# Patient Record
Sex: MALE | Race: WHITE | NOT HISPANIC OR LATINO | Employment: FULL TIME | URBAN - METROPOLITAN AREA
[De-identification: names, ages, dates, MRNs, and addresses within clinical notes are randomized per-mention and may not be internally consistent; named-entity substitution may affect disease eponyms.]

---

## 2023-07-17 LAB
ALANINE AMINOTRANSFERASE (SGPT) (U/L) IN SER/PLAS: 21 U/L (ref 10–52)
ALBUMIN (G/DL) IN SER/PLAS: 4.5 G/DL (ref 3.4–5)
ALKALINE PHOSPHATASE (U/L) IN SER/PLAS: 51 U/L (ref 33–120)
ANION GAP IN SER/PLAS: 9 MMOL/L (ref 10–20)
ASPARTATE AMINOTRANSFERASE (SGOT) (U/L) IN SER/PLAS: 18 U/L (ref 9–39)
BASOPHILS (10*3/UL) IN BLOOD BY AUTOMATED COUNT: 0.04 X10E9/L (ref 0–0.1)
BASOPHILS/100 LEUKOCYTES IN BLOOD BY AUTOMATED COUNT: 0.9 % (ref 0–2)
BILIRUBIN TOTAL (MG/DL) IN SER/PLAS: 0.9 MG/DL (ref 0–1.2)
CALCIUM (MG/DL) IN SER/PLAS: 9.5 MG/DL (ref 8.6–10.3)
CARBON DIOXIDE, TOTAL (MMOL/L) IN SER/PLAS: 29 MMOL/L (ref 21–32)
CHLORIDE (MMOL/L) IN SER/PLAS: 105 MMOL/L (ref 98–107)
CHOLESTEROL (MG/DL) IN SER/PLAS: 172 MG/DL (ref 0–199)
CHOLESTEROL IN HDL (MG/DL) IN SER/PLAS: 54.5 MG/DL
CHOLESTEROL/HDL RATIO: 3.2
CREATININE (MG/DL) IN SER/PLAS: 1 MG/DL (ref 0.5–1.3)
EOSINOPHILS (10*3/UL) IN BLOOD BY AUTOMATED COUNT: 0.08 X10E9/L (ref 0–0.7)
EOSINOPHILS/100 LEUKOCYTES IN BLOOD BY AUTOMATED COUNT: 1.8 % (ref 0–6)
ERYTHROCYTE DISTRIBUTION WIDTH (RATIO) BY AUTOMATED COUNT: 13.1 % (ref 11.5–14.5)
ERYTHROCYTE MEAN CORPUSCULAR HEMOGLOBIN CONCENTRATION (G/DL) BY AUTOMATED: 33.7 G/DL (ref 32–36)
ERYTHROCYTE MEAN CORPUSCULAR VOLUME (FL) BY AUTOMATED COUNT: 91 FL (ref 80–100)
ERYTHROCYTES (10*6/UL) IN BLOOD BY AUTOMATED COUNT: 4.64 X10E12/L (ref 4.5–5.9)
GFR MALE: >90 ML/MIN/1.73M2
GLUCOSE (MG/DL) IN SER/PLAS: 94 MG/DL (ref 74–99)
HEMATOCRIT (%) IN BLOOD BY AUTOMATED COUNT: 42.1 % (ref 41–52)
HEMOGLOBIN (G/DL) IN BLOOD: 14.2 G/DL (ref 13.5–17.5)
IMMATURE GRANULOCYTES/100 LEUKOCYTES IN BLOOD BY AUTOMATED COUNT: 0.2 % (ref 0–0.9)
LDL: 95 MG/DL (ref 0–99)
LEUKOCYTES (10*3/UL) IN BLOOD BY AUTOMATED COUNT: 4.6 X10E9/L (ref 4.4–11.3)
LYMPHOCYTES (10*3/UL) IN BLOOD BY AUTOMATED COUNT: 1.5 X10E9/L (ref 1.2–4.8)
LYMPHOCYTES/100 LEUKOCYTES IN BLOOD BY AUTOMATED COUNT: 32.9 % (ref 13–44)
MONOCYTES (10*3/UL) IN BLOOD BY AUTOMATED COUNT: 0.42 X10E9/L (ref 0.1–1)
MONOCYTES/100 LEUKOCYTES IN BLOOD BY AUTOMATED COUNT: 9.2 % (ref 2–10)
NEUTROPHILS (10*3/UL) IN BLOOD BY AUTOMATED COUNT: 2.51 X10E9/L (ref 1.2–7.7)
NEUTROPHILS/100 LEUKOCYTES IN BLOOD BY AUTOMATED COUNT: 55 % (ref 40–80)
NRBC (PER 100 WBCS) BY AUTOMATED COUNT: 0 /100 WBC (ref 0–0)
PLATELETS (10*3/UL) IN BLOOD AUTOMATED COUNT: 233 X10E9/L (ref 150–450)
POTASSIUM (MMOL/L) IN SER/PLAS: 4 MMOL/L (ref 3.5–5.3)
PROTEIN TOTAL: 6.8 G/DL (ref 6.4–8.2)
SODIUM (MMOL/L) IN SER/PLAS: 139 MMOL/L (ref 136–145)
THYROTROPIN (MIU/L) IN SER/PLAS BY DETECTION LIMIT <= 0.05 MIU/L: 1.87 MIU/L (ref 0.44–3.98)
TRIGLYCERIDE (MG/DL) IN SER/PLAS: 113 MG/DL (ref 0–149)
UREA NITROGEN (MG/DL) IN SER/PLAS: 14 MG/DL (ref 6–23)
VLDL: 23 MG/DL (ref 0–40)

## 2023-08-07 LAB — PROSTATE SPECIFIC ANTIGEN,SCREEN: 0.18 NG/ML (ref 0–4)

## 2023-10-31 ENCOUNTER — LAB (OUTPATIENT)
Dept: LAB | Facility: LAB | Age: 43
End: 2023-10-31
Payer: COMMERCIAL

## 2023-10-31 ENCOUNTER — OFFICE VISIT (OUTPATIENT)
Dept: PRIMARY CARE | Facility: CLINIC | Age: 43
End: 2023-10-31
Payer: COMMERCIAL

## 2023-10-31 VITALS
HEART RATE: 52 BPM | WEIGHT: 182.4 LBS | SYSTOLIC BLOOD PRESSURE: 133 MMHG | HEIGHT: 67 IN | DIASTOLIC BLOOD PRESSURE: 90 MMHG | TEMPERATURE: 97.7 F | BODY MASS INDEX: 28.63 KG/M2

## 2023-10-31 DIAGNOSIS — R53.83 TIREDNESS: ICD-10-CM

## 2023-10-31 DIAGNOSIS — L81.8 TATTOO: ICD-10-CM

## 2023-10-31 DIAGNOSIS — E66.3 OVERWEIGHT: ICD-10-CM

## 2023-10-31 DIAGNOSIS — I35.1 AORTIC EJECTION MURMUR: ICD-10-CM

## 2023-10-31 DIAGNOSIS — R37 SEXUAL DYSFUNCTION: ICD-10-CM

## 2023-10-31 DIAGNOSIS — R37 SEXUAL DYSFUNCTION: Primary | ICD-10-CM

## 2023-10-31 DIAGNOSIS — N52.8 OTHER MALE ERECTILE DYSFUNCTION: ICD-10-CM

## 2023-10-31 DIAGNOSIS — R55 SYNCOPE, VASOVAGAL: ICD-10-CM

## 2023-10-31 DIAGNOSIS — Z56.6 STRESS AT WORK: ICD-10-CM

## 2023-10-31 LAB
CRP SERPL-MCNC: <0.1 MG/DL
ERYTHROCYTE [SEDIMENTATION RATE] IN BLOOD BY WESTERGREN METHOD: <1 MM/H (ref 0–15)
VIT B12 SERPL-MCNC: 846 PG/ML (ref 211–911)

## 2023-10-31 PROCEDURE — 84402 ASSAY OF FREE TESTOSTERONE: CPT

## 2023-10-31 PROCEDURE — 86140 C-REACTIVE PROTEIN: CPT

## 2023-10-31 PROCEDURE — 36415 COLL VENOUS BLD VENIPUNCTURE: CPT

## 2023-10-31 PROCEDURE — 99214 OFFICE O/P EST MOD 30 MIN: CPT | Performed by: INTERNAL MEDICINE

## 2023-10-31 PROCEDURE — 85652 RBC SED RATE AUTOMATED: CPT

## 2023-10-31 PROCEDURE — 82607 VITAMIN B-12: CPT

## 2023-10-31 PROCEDURE — 1036F TOBACCO NON-USER: CPT | Performed by: INTERNAL MEDICINE

## 2023-10-31 RX ORDER — FLUTICASONE PROPIONATE 50 MCG
2 SPRAY, SUSPENSION (ML) NASAL
COMMUNITY

## 2023-10-31 RX ORDER — FINASTERIDE 5 MG
TABLET ORAL
COMMUNITY

## 2023-10-31 SDOH — HEALTH STABILITY - MENTAL HEALTH: OTHER PHYSICAL AND MENTAL STRAIN RELATED TO WORK: Z56.6

## 2023-10-31 NOTE — PROGRESS NOTES
Assessment/Plan     43 years old male who is generally healthy came with nonspecific symptoms of tiredness and sexual dysfunction.  He also has some erectile dysfunction.  Patient's exam is without any significant abnormalities.  Patient denies depression and he does not want to take any antidepressants.  His lab test from July and his visits in July were reviewed and discussed.  He will have the blood test as ordered and depending on the results we will decide about any intervention.  Low testosterone was discussed in detail.    Patient has chronic arctic murmur for which she has had previous investigations.  We will do an 2D echocardiogram.  Patient also had previous tilt table test which was without any significant abnormalities.  He was also seen by cardiologist in the past.    Patient will be followed up in 4 to 6 weeks time to have further evaluation depending on the labs and his symptoms.      Problem List Items Addressed This Visit       Overweight    Relevant Orders    Testosterone,Free and Total    Syncope, vasovagal    Tattoo    Relevant Orders    Testosterone,Free and Total    Sexual dysfunction - Primary    Relevant Orders    Testosterone,Free and Total    CBC and Auto Differential    Comprehensive Metabolic Panel    C-Reactive Protein    Sedimentation Rate     Other Visit Diagnoses       Other male erectile dysfunction        Relevant Orders    Testosterone,Free and Total    Tiredness        Relevant Orders    Testosterone,Free and Total    Vitamin D 25-Hydroxy,Total (for eval of Vitamin D levels)    Vitamin B12    Stress at work        Aortic ejection murmur        Relevant Orders    Transthoracic Echo (TTE) Complete            Subjective     Patient ID: Nj Veras is a 43 y.o. male who presents for Follow-up (Blood work).    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    Patient says he has a excellent marriage life.  He has 2 children.  Other than usual stress at work he has no depression.  He is feeling generally tired all the time even though he is exercising regularly.  He has less sexual desire and also he has some erectile dysfunction.  He denies any further episodes of syncope.  He denies any weight loss.  He is not suicidal or homicidal.  ROS  Rest of the review of systems no acute complaints.  No family history on file.   Social History     Socioeconomic History    Marital status:      Spouse name: Not on file    Number of children: Not on file    Years of education: Not on file    Highest education level: Not on file   Occupational History    Not on file   Tobacco Use    Smoking status: Never    Smokeless tobacco: Never   Substance and Sexual Activity    Alcohol use: Not Currently    Drug use: Never    Sexual activity: Not on file   Other Topics Concern    Not on file   Social History Narrative    Not on file     Social Determinants of Health     Financial Resource Strain: Not on file   Food Insecurity: Not on file   Transportation Needs: Not on file   Physical Activity: Not on file   Stress: Not on file   Social Connections: Not on file   Intimate Partner Violence: Not on file   Housing Stability: Not on file      Patient has no known allergies.   Current Outpatient Medications   Medication Sig Dispense Refill    fluticasone (Flonase) 50 mcg/actuation nasal spray Administer 2 sprays into affected nostril(s) once daily.      Proscar 5 mg tablet TAKE 1/4 (ONE-FOURTH) TABLET BY MOUTH EVERY DAY       No current facility-administered medications for this visit.        Objective     Vitals:    10/31/23 1209   BP: 133/90   Pulse: 52   Temp: 36.5 °C (97.7 °F)        Physical Exam   Normal-built, well-nourished  with no apparent distress. Alert oriented  Has severe  Skin:  Normal turgor.  No  rash.  Head:  Normocephalic, atraumatic.  Eyes:  Pupils are equal, round,.  No pallor of conjunctivae.  Mouth has moist oral mucosa.  Pharynx appears normal.  No erythema.  Neck:  Supple.  No JVD.  No carotid bruit.  No thyromegaly. No cervical lymphadenopathy.  No clubbing  Chest:  Vesicular breathing Bilaterally good air entry and bilaterally clear to auscultation.  No wheezing.  No crackles.  Heart:  Regular rate and rhythm.  S1, S2 positive.  Ejection systolic murmur over the aortic area.  Abdomen:  Soft and nontender.  Bowel sounds are positive.  No organomegaly.  Extremities:  Bilaterally no pedal pitting edema.  Bilaterally 2+ dorsalis pedis pulses.  No calf tenderness. Homans sign is negative.  Neuro Exam: No focal signs. Gait is normal.      Problem List Items Addressed This Visit       Overweight    Relevant Orders    Testosterone,Free and Total    Syncope, vasovagal    Tattoo    Relevant Orders    Testosterone,Free and Total    Sexual dysfunction - Primary    Relevant Orders    Testosterone,Free and Total    CBC and Auto Differential    Comprehensive Metabolic Panel    C-Reactive Protein    Sedimentation Rate     Other Visit Diagnoses       Other male erectile dysfunction        Relevant Orders    Testosterone,Free and Total    Tiredness        Relevant Orders    Testosterone,Free and Total    Vitamin D 25-Hydroxy,Total (for eval of Vitamin D levels)    Vitamin B12    Stress at work        Aortic ejection murmur        Relevant Orders    Transthoracic Echo (TTE) Complete             Orders Placed This Encounter   Procedures    Testosterone,Free and Total     Standing Status:   Future     Standing Expiration Date:   10/31/2024     Order Specific Question:   Release result to [facility]     Answer:   Immediate [1]    Vitamin D 25-Hydroxy,Total (for eval of Vitamin D levels)     Standing Status:   Future     Standing Expiration Date:   10/31/2024     Order Specific Question:   Release result to [facility]      Answer:   Immediate    Vitamin B12     Standing Status:   Future     Standing Expiration Date:   10/31/2024     Order Specific Question:   [facility]     Answer:   Immediate [1]    CBC and Auto Differential     Standing Status:   Future     Standing Expiration Date:   10/31/2024     Order Specific Question:   [facility]     Answer:   Immediate    Comprehensive Metabolic Panel     Standing Status:   Future     Standing Expiration Date:   10/31/2024     Order Specific Question:   [facility]     Answer:   Immediate    C-Reactive Protein     Standing Status:   Future     Standing Expiration Date:   10/31/2024     Order Specific Question:   [facility]     Answer:   Immediate [1]    Sedimentation Rate     Standing Status:   Future     Standing Expiration Date:   10/31/2024     Order Specific Question:   [facility]     Answer:   Immediate [1]    Transthoracic Echo (TTE) Complete     Standing Status:   Future     Standing Expiration Date:   10/31/2025     Order Specific Question:   Reason for exam:     Answer:   Aortic murmur     Order Specific Question:   Possible 3D echo?     Answer:   No     Order Specific Question:   Possible strain echo?     Answer:   No     Order Specific Question:   Where is your preferred location to perform this study?     Answer:   First Available        Lab Results   Component Value Date    WBC 4.6 07/17/2023    HGB 14.2 07/17/2023    HCT 42.1 07/17/2023     07/17/2023    CHOL 172 07/17/2023    TRIG 113 07/17/2023    HDL 54.5 07/17/2023    ALT 21 07/17/2023    AST 18 07/17/2023     07/17/2023    K 4.0 07/17/2023     07/17/2023    CREATININE 1.00 07/17/2023    BUN 14 07/17/2023    CO2 29 07/17/2023    TSH 1.87 07/17/2023     Lab Results   Component Value Date    CHOL 172 07/17/2023    CHHDL 3.2 07/17/2023       No results found.

## 2023-11-02 ENCOUNTER — TELEPHONE (OUTPATIENT)
Dept: PRIMARY CARE | Facility: CLINIC | Age: 43
End: 2023-11-02
Payer: COMMERCIAL

## 2023-11-02 NOTE — TELEPHONE ENCOUNTER
----- Message from Karena Fernández MD sent at 11/1/2023  3:53 PM EDT -----  Please, inform the pt that the recent BW result for vitamin B 12 level was WNL.  Thank you

## 2023-11-04 LAB
TESTOSTERONE FREE (CHAN): 62.2 PG/ML (ref 35–155)
TESTOSTERONE,TOTAL,LC-MS/MS: 468 NG/DL (ref 250–1100)

## 2023-11-13 ENCOUNTER — HOSPITAL ENCOUNTER (OUTPATIENT)
Dept: CARDIOLOGY | Facility: CLINIC | Age: 43
Discharge: HOME | End: 2023-11-13
Payer: COMMERCIAL

## 2023-11-13 ENCOUNTER — APPOINTMENT (OUTPATIENT)
Dept: CARDIOLOGY | Facility: CLINIC | Age: 43
End: 2023-11-13
Payer: COMMERCIAL

## 2023-11-13 VITALS
WEIGHT: 182 LBS | HEIGHT: 67 IN | SYSTOLIC BLOOD PRESSURE: 126 MMHG | BODY MASS INDEX: 28.56 KG/M2 | DIASTOLIC BLOOD PRESSURE: 76 MMHG

## 2023-11-13 DIAGNOSIS — I35.1 AORTIC EJECTION MURMUR: ICD-10-CM

## 2023-11-13 PROCEDURE — 93306 TTE W/DOPPLER COMPLETE: CPT | Performed by: INTERNAL MEDICINE

## 2023-11-13 PROCEDURE — 93306 TTE W/DOPPLER COMPLETE: CPT

## 2023-11-16 LAB
AORTIC VALVE MEAN GRADIENT: 7
AORTIC VALVE PEAK VELOCITY: 1.77
AV PEAK GRADIENT: 12.5
AVA (PEAK VEL): 2.18
AVA (VTI): 1.98
EJECTION FRACTION APICAL 4 CHAMBER: 67.3
LEFT VENTRICLE INTERNAL DIMENSION DIASTOLE: 5.56 (ref 3.5–6)
LEFT VENTRICULAR OUTFLOW TRACT DIAMETER: 2
MITRAL VALVE E/A RATIO: 1.33
MITRAL VALVE E/E' RATIO: 6.3
RIGHT VENTRICLE PEAK SYSTOLIC PRESSURE: 23.1

## 2023-12-06 ENCOUNTER — OFFICE VISIT (OUTPATIENT)
Dept: PRIMARY CARE | Facility: CLINIC | Age: 43
End: 2023-12-06
Payer: COMMERCIAL

## 2023-12-06 VITALS
BODY MASS INDEX: 28.79 KG/M2 | TEMPERATURE: 97.5 F | WEIGHT: 183.4 LBS | HEIGHT: 67 IN | HEART RATE: 50 BPM | SYSTOLIC BLOOD PRESSURE: 129 MMHG | DIASTOLIC BLOOD PRESSURE: 82 MMHG

## 2023-12-06 DIAGNOSIS — L81.8 TATTOO: ICD-10-CM

## 2023-12-06 DIAGNOSIS — R37 SEXUAL DYSFUNCTION: ICD-10-CM

## 2023-12-06 DIAGNOSIS — E66.3 OVERWEIGHT: Primary | ICD-10-CM

## 2023-12-06 PROCEDURE — 99213 OFFICE O/P EST LOW 20 MIN: CPT | Performed by: INTERNAL MEDICINE

## 2023-12-06 PROCEDURE — 1036F TOBACCO NON-USER: CPT | Performed by: INTERNAL MEDICINE

## 2023-12-06 ASSESSMENT — PATIENT HEALTH QUESTIONNAIRE - PHQ9
1. LITTLE INTEREST OR PLEASURE IN DOING THINGS: NOT AT ALL
2. FEELING DOWN, DEPRESSED OR HOPELESS: NOT AT ALL
SUM OF ALL RESPONSES TO PHQ9 QUESTIONS 1 AND 2: 0

## 2023-12-06 NOTE — PROGRESS NOTES
Assessment/Plan   43 years old male who is healthy and active as acceptable blood test physical exam and also echocardiogram.  Patient's echocardiogram was reviewed in detail on this visit.  We reviewed and discussed the patient's concern about sexual dysfunction.  I offered him to have a small dose of Viagra or similar medications to see whether that will help but patient at this time does not want to use them.  On my clinical exam there is no significant abnormalities which could give rise to his symptoms of sexual dysfunction.  Patient will see a urologist for further evaluation and appropriate treatment or management options.  He does not want to do any counseling and he feels like he is mentally stable and he has good relationship with his spouse.    Patient will be followed up in 6 months time with the lab test as ordered but if there is any concern or need arises patient understands to see me soon.      Problem List Items Addressed This Visit       Overweight - Primary    Relevant Orders    CBC and Auto Differential    Comprehensive Metabolic Panel    Lipid Panel    Tattoo    Sexual dysfunction    Relevant Orders    Referral to Urology    CBC and Auto Differential    Comprehensive Metabolic Panel    Lipid Panel       Subjective     Patient ID: Nj Veras is a 43 y.o. male who presents for Follow-up (Follow up).    History of present illness  43 years old male who is generally healthy and active came for an follow-up visit.  Overall patient is doing very well but he still have concern about his sexual dysfunction.  He has a good marriage life.  They went on vacation to [facility].  Patient says his sexual desire is usually normal except he does not have the ejaculation as it was in the past.  He says it is almost like blocked.  His erection is satisfactory but he is cannot have the ejaculation which makes it difficult for him to have the sexual desire.  We had a detailed investigations including serum  testosterone levels, any other secondary causes such as thyroid dysfunction has been excluded.  Patient is taking 1 mg Proscar as per the dermatologist.  He says he stopped it for 2 weeks and no changes.    He is active and he exercises regularly.    His echocardiogram was reviewed in this visit in detail    Rest of the review of systems no acute complaints.    No family history on file.   Social History     Socioeconomic History    Marital status:      Spouse name: Not on file    Number of children: Not on file    Years of education: Not on file    Highest education level: Not on file   Occupational History    Not on file   Tobacco Use    Smoking status: Never    Smokeless tobacco: Never   Substance and Sexual Activity    Alcohol use: Not Currently     Comment: Rarely    Drug use: Never    Sexual activity: Not on file   Other Topics Concern    Not on file   Social History Narrative    Not on file     Social Determinants of Health     Financial Resource Strain: Not on file   Food Insecurity: Not on file   Transportation Needs: Not on file   Physical Activity: Not on file   Stress: Not on file   Social Connections: Not on file   Intimate Partner Violence: Not on file   Housing Stability: Not on file      Patient has no known allergies.   Current Outpatient Medications   Medication Sig Dispense Refill    Proscar 5 mg tablet TAKE 1/4 (ONE-FOURTH) TABLET BY MOUTH EVERY DAY      fluticasone (Flonase) 50 mcg/actuation nasal spray Administer 2 sprays into affected nostril(s) once daily.       No current facility-administered medications for this visit.       Objective     Vitals:    12/06/23 0938   BP: 129/82   Pulse: 50   Temp: 36.4 °C (97.5 °F)        Physical Exam   Normal-built, well-nourished  with no apparent distress. Alert oriented  Has beard  Skin:  Normal turgor.  No rash.  Head:  Normocephalic, atraumatic.  Eyes:  Pupils are equal, round,.  No pallor of conjunctivae.  Mouth has moist oral mucosa.    Neck:   Supple.  No JVD.  No carotid bruit.  No thyromegaly. No cervical lymphadenopathy.  No clubbing  Chest:  Vesicular breathing Bilaterally good air entry and bilaterally clear to auscultation.  No wheezing.  No crackles.  Heart:  Regular rate and rhythm.  S1, S2 positive.    Abdomen:  Soft and nontender.  Bowel sounds are positive.  No organomegaly.  Groin exam is normal without any hernia.  Genital exam shows no significant abnormalities.  No testicular tenderness, on my exam the testicular volume is within normal limits.  Extremities:  Bilaterally no pedal pitting edema.  Bilaterally 2+ dorsalis pedis pulses.  No calf tenderness. Homans sign is negative.  Neuro Exam: No focal signs. Gait is normal.        Problem List Items Addressed This Visit       Overweight - Primary    Relevant Orders    CBC and Auto Differential    Comprehensive Metabolic Panel    Lipid Panel    Tattoo    Sexual dysfunction    Relevant Orders    Referral to Urology    CBC and Auto Differential    Comprehensive Metabolic Panel    Lipid Panel        Orders Placed This Encounter   Procedures    CBC and Auto Differential     Standing Status:   Future     Standing Expiration Date:   12/6/2024     Order Specific Question:   Release result to [facility]     Answer:   Immediate    Comprehensive Metabolic Panel     Standing Status:   Future     Standing Expiration Date:   12/6/2024     Order Specific Question:   Release result to MyChart     Answer:   Immediate    Lipid Panel     fasting     Standing Status:   Future     Standing Expiration Date:   6/6/2024     Order Specific Question:   Release result to [facility]     Answer:   Immediate [1]    Referral to Urology     Standing Status:   Future     Standing Expiration Date:   6/6/2024     Referral Priority:   Routine     Referral Type:   Consultation     Referral Reason:   Specialty Services Required     Requested Specialty:   Urology     Number of Visits Requested:   1        Lab Results   Component Value  Date    WBC 4.6 07/17/2023    HGB 14.2 07/17/2023    HCT 42.1 07/17/2023     07/17/2023    CHOL 172 07/17/2023    TRIG 113 07/17/2023    HDL 54.5 07/17/2023    ALT 21 07/17/2023    AST 18 07/17/2023     07/17/2023    K 4.0 07/17/2023     07/17/2023    CREATININE 1.00 07/17/2023    BUN 14 07/17/2023    CO2 29 07/17/2023    TSH 1.87 07/17/2023     Lab Results   Component Value Date    CHOL 172 07/17/2023    CHHDL 3.2 07/17/2023       Transthoracic Echo (TTE) Complete    Result Date: 11/16/2023                  [facility] [id number] [place] [address], [address] [address]            Tel [phone] Fax [phone] TRANSTHORACIC ECHOCARDIOGRAM REPORT  Patient Name:      DEBBI TIRADO         Reading Physician:    [id number] Carly Howell MD Study Date:        11/13/2023           Ordering Provider:    [id number] KAIT HERNANDEZ MRN/PID:           [id number]             Fellow: Accession#:        [id number]         Nurse: Date of Birth/Age: 1980 / 43 years Sonographer:          KRISTIN Durant RDMS, RVS Gender:            M                    Additional Staff: Height:            170.18 cm            Admit Date: Weight:            82.56 kg             Admission Status: BSA:               1.94 m2              Department Location:  [facility] Blood Pressure: 126 /76 mmHg Study Type:    TRANSTHORACIC ECHO (TTE) COMPLETE Diagnosis/ICD: Nonrheumatic aortic (valve) insufficiency-I35.1 Indication:    Aortic ejection murmur CPT Codes:     Echo Complete w Full Doppler-[id number]  Study Detail: The following Echo studies were performed: 2D, M-Mode, Doppler and               color flow.  PHYSICIAN INTERPRETATION: Left Ventricle: Left ventricular systolic function is normal, with an estimated ejection  fraction of 65%. There are no regional wall motion abnormalities. The left ventricular cavity size is normal. There is no evidence of left ventricular hypertrophy. Spectral Doppler shows a normal pattern of left ventricular diastolic filling. There is no definite left ventricular thrombus visualized. The intraventricular septum appears intact without evidence of shunting or a ventricular septal defect. Left Atrium: The left atrium is normal in size. Right Ventricle: The right ventricle is normal in size. There is normal right ventricular global systolic function. Right Atrium: The right atrium is normal in size. Aortic Valve: The aortic valve is trileaflet. There is mild aortic valve thickening. There is no evidence of aortic valve stenosis. The aortic valve dimensionless index is 0.63. There is no evidence of aortic valve regurgitation. The peak instantaneous gradient of the aortic valve is 12.5 mmHg. The mean gradient of the aortic valve is 7.0 mmHg. Mitral Valve: The mitral valve is mildly thickened. There is no evidence of mitral valve prolapse. There is trace mitral valve regurgitation. Tricuspid Valve: The tricuspid valve is structurally normal. There is no evidence of tricuspid valve stenosis. There is trace to mild tricuspid regurgitation. The Doppler estimated RVSP is within normal limits at 23.1 mmHg. Pulmonic Valve: The pulmonic valve is not well visualized. There is no indication of pulmonic valve regurgitation. Pericardium: There is no pericardial effusion noted. Aorta: The aortic root is normal. Systemic Veins: The inferior vena cava appears to be of normal size. There is IVC inspiratory collapse greater than 50%.  CONCLUSIONS:  1. Left ventricular systolic function is normal with a 65% estimated ejection fraction.  2. Intact intraventricular septum without shunting or a ventricular septal defect.  3. No left ventricular thrombus visualized.  4. There is no evidence of left ventricular hypertrophy.  5.  No evidence of mitral valve prolapse.  6. There is No tricuspid stenosis.  7. RVSP within normal limits.  8. Aortic valve stenosis is not present. QUANTITATIVE DATA SUMMARY: 2D MEASUREMENTS:                           Normal Ranges: Ao Root d:     2.90 cm    (2.0-3.7cm) LAs:           3.20 cm    (2.7-4.0cm) RVIDd:         2.13 cm    (0.9-3.6cm) IVSd:          1.08 cm    (0.6-1.1cm) LVPWd:         0.98 cm    (0.6-1.1cm) LVIDd:         5.56 cm    (3.9-5.9cm) LVIDs:         3.23 cm LV Mass Index: 115.9 g/m2 LV % FS        41.9 % AORTA MEASUREMENTS:                    Normal Ranges: Asc Ao, d: 2.40 cm (2.1-3.4cm) LV SYSTOLIC FUNCTION BY 2D PLANIMETRY (MOD):                     Normal Ranges: EF-A4C View: 67.3 % (>=55%) LV DIASTOLIC FUNCTION:                        Normal Ranges: MV Peak E:    0.85 m/s (0.7-1.2 m/s) MV Peak A:    0.64 m/s (0.42-0.7 m/s) E/A Ratio:    1.33     (1.0-2.2) MV lateral e' 0.14 m/s MV medial e'  0.09 m/s E/e' Ratio:   6.30     (<8.0) MITRAL INSUFFICIENCY:                      Normal Ranges: MR Vmax: 380.50 cm/s AORTIC VALVE:                                    Normal Ranges: AoV Vmax:                1.77 m/s  (<=1.7m/s) AoV Peak P.5 mmHg (<20mmHg) AoV Mean P.0 mmHg  (1.7-11.5mmHg) LVOT Max Venkata:            1.23 m/s  (<=1.1m/s) AoV VTI:                 42.90 cm  (18-25cm) LVOT VTI:                27.00 cm LVOT Diameter:           2.00 cm   (1.8-2.4cm) AoV Area, VTI:           1.98 cm2  (2.5-5.5cm2) AoV Area,Vmax:           2.18 cm2  (2.5-4.5cm2) AoV Dimensionless Index: 0.63 TRICUSPID VALVE/RVSP:                             Normal Ranges: Peak TR Velocity: 2.24 m/s Est. RA Pressure: 3 mmHg RV Syst Pressure: 23.1 mmHg (< 30mmHg) PULMONIC VALVE:                      Normal Ranges: PV Max Venkata: 0.6 m/s  (0.6-0.9m/s) PV Max P.7 mmHg  [id number] Carly Howell MD Electronically signed on 2023 at 8:28:32 PM  ** Final **

## 2024-01-23 ENCOUNTER — OFFICE VISIT (OUTPATIENT)
Dept: UROLOGY | Facility: CLINIC | Age: 44
End: 2024-01-23
Payer: COMMERCIAL

## 2024-01-23 DIAGNOSIS — N50.819 PAIN IN TESTICLE, UNSPECIFIED LATERALITY: ICD-10-CM

## 2024-01-23 DIAGNOSIS — Z00.00 HEALTH MAINTENANCE EXAMINATION: ICD-10-CM

## 2024-01-23 DIAGNOSIS — R37 SEXUAL DYSFUNCTION: Primary | ICD-10-CM

## 2024-01-23 LAB
MUCOUS THREADS #/AREA URNS AUTO: NORMAL /LPF
POC APPEARANCE, URINE: CLEAR
POC BILIRUBIN, URINE: NEGATIVE
POC BLOOD, URINE: NEGATIVE
POC COLOR, URINE: YELLOW
POC GLUCOSE, URINE: NEGATIVE MG/DL
POC KETONES, URINE: NEGATIVE MG/DL
POC LEUKOCYTES, URINE: NEGATIVE
POC NITRITE,URINE: NEGATIVE
POC PH, URINE: 7.5 PH
POC PROTEIN, URINE: NEGATIVE MG/DL
POC SPECIFIC GRAVITY, URINE: 1.01
POC UROBILINOGEN, URINE: 0.2 EU/DL
RBC #/AREA URNS AUTO: NORMAL /HPF
WBC #/AREA URNS AUTO: NORMAL /HPF

## 2024-01-23 PROCEDURE — 81003 URINALYSIS AUTO W/O SCOPE: CPT | Performed by: UROLOGY

## 2024-01-23 PROCEDURE — 1036F TOBACCO NON-USER: CPT | Performed by: UROLOGY

## 2024-01-23 PROCEDURE — 81001 URINALYSIS AUTO W/SCOPE: CPT

## 2024-01-23 PROCEDURE — 99203 OFFICE O/P NEW LOW 30 MIN: CPT | Performed by: UROLOGY

## 2024-01-23 PROCEDURE — 87086 URINE CULTURE/COLONY COUNT: CPT

## 2024-01-23 NOTE — PROGRESS NOTES
HPI  43 y.o. M seen for complaints of sexual dysfunction, referred by Dr. Roth, his PCP.    -Describes issues with ejaculation, no issue with erections  -PCP worked up, checked heart (echo), and bloodwork/testosterone (WNL)  -on Propecia (hair loss- per pt has been on med since 2010), delayed ejaculation and low volume of ejaculation since starting  -8-10 months  -feels like there is a physical barrier with ejaculation (no mental issue)   -sore in rectum day after sex  -no testicular pain  -has been taking finasteride per PCP  -trouble with ejaculation is the same with sex and masturbation  -denies dysuria, gross hematuria  -intermittent dribbling post void    Father had issues with hernias     Current Medications:  Current Outpatient Medications   Medication Sig Dispense Refill    Proscar 5 mg tablet TAKE 1/4 (ONE-FOURTH) TABLET BY MOUTH EVERY DAY      fluticasone (Flonase) 50 mcg/actuation nasal spray Administer 2 sprays into affected nostril(s) once daily.       No current facility-administered medications for this visit.        PMH:  No past medical history on file.    PSH:  Past Surgical History:   Procedure Laterality Date    OTHER SURGICAL HISTORY  07/26/2022    Leg surgery       FMH:  No family history on file.    SHx:  Social History     Tobacco Use    Smoking status: Never    Smokeless tobacco: Never   Substance Use Topics    Alcohol use: Not Currently     Comment: Rarely    Drug use: Never       Allergies:  No Known Allergies    Physical Exam   Testicles descended bilaterally, nontender, no masses  Vasa palpable bilaterally  Penis circ'd, no lesions, no plaques    Assessment/Plan  #Decreased ejaculation / Pain with ejaculation   -discussed that this may be due to taking Propecia, these are known side effects  -discussed that his pain could be due to tight pelvic floor muscles  -referral to pelvic floor PT    #LUTS/ Urinary dribbling  -recommended stopping Finasteride  -Ua/Cx / PVR today /  ureaplasma/mycopalsma  -discussed that he may need a cystoscopy in the future     Follow up in 3 months     Scribe Attestation  By signing my name below, I, Vidhya Su-Ridge, [employer], attest that this documentation  has been prepared under the direction and in the presence of Rj Ordonez MD.

## 2024-01-23 NOTE — PROGRESS NOTES
NPV ref by PCP.  -Describes issues with ejaculation, no issue with erections  -PCP worked up, checked heart (echo), and bloodwork/testosterone (WNL)  -on Propecia (hair loss- per pt has been on med since 2010)  -8-10 months  -feels like there is a physical barrier with ejaculation (no mental issue)

## 2024-01-24 LAB — BACTERIA UR CULT: NO GROWTH

## 2024-02-07 ENCOUNTER — HOSPITAL ENCOUNTER (OUTPATIENT)
Dept: RADIOLOGY | Facility: EXTERNAL LOCATION | Age: 44
Discharge: HOME | End: 2024-02-07

## 2024-02-07 DIAGNOSIS — S69.91XA INJURY OF RIGHT MIDDLE FINGER, INITIAL ENCOUNTER: ICD-10-CM

## 2024-04-23 ENCOUNTER — APPOINTMENT (OUTPATIENT)
Dept: UROLOGY | Facility: CLINIC | Age: 44
End: 2024-04-23
Payer: COMMERCIAL

## 2024-06-06 ENCOUNTER — APPOINTMENT (OUTPATIENT)
Dept: PRIMARY CARE | Facility: CLINIC | Age: 44
End: 2024-06-06
Payer: COMMERCIAL